# Patient Record
Sex: FEMALE | Race: WHITE | NOT HISPANIC OR LATINO | Employment: OTHER | URBAN - METROPOLITAN AREA
[De-identification: names, ages, dates, MRNs, and addresses within clinical notes are randomized per-mention and may not be internally consistent; named-entity substitution may affect disease eponyms.]

---

## 2023-01-01 ENCOUNTER — HOSPITAL ENCOUNTER (OUTPATIENT)
Dept: DATA CONVERSION | Facility: HOSPITAL | Age: 73
Discharge: HOME | End: 2023-08-08

## 2023-01-01 DIAGNOSIS — J40 BRONCHITIS, NOT SPECIFIED AS ACUTE OR CHRONIC: ICD-10-CM

## 2023-08-29 ENCOUNTER — HOSPITAL ENCOUNTER (OUTPATIENT)
Dept: DATA CONVERSION | Facility: HOSPITAL | Age: 73
End: 2023-08-29

## 2023-08-29 DIAGNOSIS — J96.11 CHRONIC RESPIRATORY FAILURE WITH HYPOXIA (MULTI): ICD-10-CM

## 2023-09-13 PROBLEM — E03.9 HYPOTHYROIDISM: Status: ACTIVE | Noted: 2023-09-13

## 2023-09-13 PROBLEM — F51.05 INSOMNIA DUE TO OTHER MENTAL DISORDER: Status: ACTIVE | Noted: 2023-09-13

## 2023-09-13 PROBLEM — M81.0 AGE-RELATED OSTEOPOROSIS WITHOUT CURRENT PATHOLOGICAL FRACTURE: Status: ACTIVE | Noted: 2023-09-13

## 2023-09-13 PROBLEM — F41.1 GENERALIZED ANXIETY DISORDER: Status: ACTIVE | Noted: 2023-09-13

## 2023-09-13 PROBLEM — Z85.528 HISTORY OF RENAL CELL CANCER: Status: ACTIVE | Noted: 2023-09-13

## 2023-09-13 PROBLEM — J43.9: Status: ACTIVE | Noted: 2023-09-13

## 2023-09-13 PROBLEM — R39.9 SYMPTOMS INVOLVING URINARY SYSTEM: Status: ACTIVE | Noted: 2023-09-13

## 2023-09-13 PROBLEM — C64.9 RENAL CELL CARCINOMA (MULTI): Status: ACTIVE | Noted: 2023-09-13

## 2023-09-13 PROBLEM — M79.7 FIBROMYALGIA: Status: ACTIVE | Noted: 2023-09-13

## 2023-09-13 PROBLEM — E55.9 VITAMIN D DEFICIENCY: Status: ACTIVE | Noted: 2023-09-13

## 2023-09-13 PROBLEM — D72.829 LEUKOCYTOSIS: Status: ACTIVE | Noted: 2023-09-13

## 2023-09-13 PROBLEM — F99 INSOMNIA DUE TO OTHER MENTAL DISORDER: Status: ACTIVE | Noted: 2023-09-13

## 2023-09-13 PROBLEM — C80.1 MALIGNANCY (MULTI): Status: ACTIVE | Noted: 2023-09-13

## 2023-09-13 PROBLEM — M79.10 MYALGIA: Status: ACTIVE | Noted: 2023-09-13

## 2023-09-13 PROBLEM — H61.23 IMPACTED CERUMEN OF BOTH EARS: Status: ACTIVE | Noted: 2023-09-13

## 2023-09-13 PROBLEM — F41.9 ANXIETY: Status: ACTIVE | Noted: 2023-09-13

## 2023-09-13 PROBLEM — N95.1 MENOPAUSAL STATE: Status: ACTIVE | Noted: 2023-09-13

## 2023-09-13 PROBLEM — I25.10 CORONARY ARTERY DISEASE: Status: ACTIVE | Noted: 2023-09-13

## 2023-09-13 PROBLEM — R06.00 DYSPNEA: Status: ACTIVE | Noted: 2023-09-13

## 2023-09-13 PROBLEM — J44.9 CHRONIC OBSTRUCTIVE PULMONARY DISEASE (MULTI): Status: ACTIVE | Noted: 2023-09-13

## 2023-09-13 PROBLEM — M81.0 OSTEOPOROSIS: Status: ACTIVE | Noted: 2023-09-13

## 2023-09-13 PROBLEM — M35.9 SYSTEMIC INVOLVEMENT OF CONNECTIVE TISSUE, UNSPECIFIED (MULTI): Status: ACTIVE | Noted: 2023-09-13

## 2023-09-13 PROBLEM — I49.1 ATRIAL ECTOPY: Status: ACTIVE | Noted: 2023-09-13

## 2023-09-13 PROBLEM — L27.0 ALLERGIC DRUG RASH: Status: ACTIVE | Noted: 2023-09-13

## 2023-09-13 PROBLEM — S99.929A FOOT INJURY: Status: ACTIVE | Noted: 2023-09-13

## 2023-09-13 PROBLEM — E78.00 PURE HYPERCHOLESTEROLEMIA: Status: ACTIVE | Noted: 2023-09-13

## 2023-09-13 PROBLEM — S93.601A SPRAIN OF RIGHT FOOT: Status: ACTIVE | Noted: 2023-09-13

## 2023-09-13 PROBLEM — M19.90 INFLAMMATORY ARTHRITIS: Status: ACTIVE | Noted: 2023-09-13

## 2023-09-13 PROBLEM — M06.4 INFLAMMATORY POLYARTHROPATHY (MULTI): Status: ACTIVE | Noted: 2023-09-13

## 2023-09-13 PROBLEM — M36.1: Status: ACTIVE | Noted: 2023-09-13

## 2023-09-13 PROBLEM — M19.90 OSTEOARTHRITIS: Status: ACTIVE | Noted: 2023-09-13

## 2023-09-13 PROBLEM — D80.1 HYPOGAMMAGLOBULINEMIA (MULTI): Status: ACTIVE | Noted: 2023-09-13

## 2023-09-13 PROBLEM — F17.200 TOBACCO DEPENDENCY: Status: ACTIVE | Noted: 2023-09-13

## 2023-09-13 PROBLEM — M85.80 OSTEOPENIA: Status: ACTIVE | Noted: 2023-09-13

## 2023-09-13 PROBLEM — M25.50 ARTHRALGIA: Status: ACTIVE | Noted: 2023-09-13

## 2023-09-13 PROBLEM — R63.4 ABNORMAL WEIGHT LOSS: Status: ACTIVE | Noted: 2023-09-13

## 2023-09-13 RX ORDER — ATORVASTATIN CALCIUM 80 MG/1
1 TABLET, FILM COATED ORAL DAILY
COMMUNITY
Start: 2017-08-15

## 2023-09-13 RX ORDER — NAPROXEN 500 MG/1
1 TABLET ORAL
COMMUNITY
Start: 2017-03-13

## 2023-09-13 RX ORDER — PAROXETINE 10 MG/1
1 TABLET, FILM COATED ORAL DAILY
COMMUNITY

## 2023-09-13 RX ORDER — TALC
1 POWDER (GRAM) TOPICAL NIGHTLY PRN
COMMUNITY

## 2023-09-13 RX ORDER — AMOXICILLIN 500 MG/1
TABLET, FILM COATED ORAL
COMMUNITY
Start: 2022-01-01

## 2023-09-13 RX ORDER — ALBUTEROL SULFATE 0.83 MG/ML
3 SOLUTION RESPIRATORY (INHALATION) EVERY 6 HOURS
COMMUNITY
Start: 2014-01-10

## 2023-09-13 RX ORDER — LEVOTHYROXINE SODIUM 50 UG/1
TABLET ORAL
COMMUNITY

## 2023-09-13 RX ORDER — MULTIVITAMIN WITH IRON
1 TABLET ORAL DAILY
COMMUNITY

## 2023-09-13 RX ORDER — BUDESONIDE 180 UG/1
AEROSOL, POWDER RESPIRATORY (INHALATION)
COMMUNITY
Start: 2016-11-11

## 2023-09-13 RX ORDER — NICOTINE 7MG/24HR
1 PATCH, TRANSDERMAL 24 HOURS TRANSDERMAL DAILY
COMMUNITY
Start: 2023-01-01

## 2023-09-13 RX ORDER — DULOXETIN HYDROCHLORIDE 20 MG/1
1 CAPSULE, DELAYED RELEASE ORAL DAILY
COMMUNITY
Start: 2022-02-08

## 2023-09-13 RX ORDER — HYDROXYZINE HYDROCHLORIDE 50 MG/1
1 TABLET, FILM COATED ORAL NIGHTLY PRN
COMMUNITY

## 2023-09-13 RX ORDER — AMOXICILLIN AND CLAVULANATE POTASSIUM 875; 125 MG/1; MG/1
TABLET, FILM COATED ORAL
COMMUNITY
Start: 2023-01-01

## 2023-09-13 RX ORDER — PREDNISONE 10 MG/1
1 TABLET ORAL
COMMUNITY

## 2023-09-13 RX ORDER — HYDROCODONE BITARTRATE AND ACETAMINOPHEN 5; 325 MG/1; MG/1
TABLET ORAL
COMMUNITY
Start: 2022-01-01

## 2023-09-13 RX ORDER — NAPROXEN SODIUM 220 MG/1
1 TABLET, FILM COATED ORAL DAILY
COMMUNITY

## 2023-09-13 RX ORDER — PREDNISONE 20 MG/1
TABLET ORAL
COMMUNITY
Start: 2023-01-01

## 2023-09-13 RX ORDER — TRAZODONE HYDROCHLORIDE 50 MG/1
TABLET ORAL NIGHTLY
COMMUNITY
Start: 2023-01-01

## 2023-09-13 RX ORDER — ALBUTEROL SULFATE 90 UG/1
2 AEROSOL, METERED RESPIRATORY (INHALATION) EVERY 4 HOURS PRN
COMMUNITY

## 2023-09-13 RX ORDER — PAROXETINE HYDROCHLORIDE 20 MG/1
TABLET, FILM COATED ORAL
COMMUNITY
Start: 2023-01-01

## 2023-09-13 RX ORDER — FLUTICASONE FUROATE, UMECLIDINIUM BROMIDE AND VILANTEROL TRIFENATATE 100; 62.5; 25 UG/1; UG/1; UG/1
1 POWDER RESPIRATORY (INHALATION) DAILY
COMMUNITY

## 2023-09-13 RX ORDER — LEVOTHYROXINE SODIUM 75 UG/1
1 TABLET ORAL
COMMUNITY
Start: 2017-10-27

## 2023-09-13 RX ORDER — ASCORBIC ACID 125 MG
TABLET,CHEWABLE ORAL
COMMUNITY

## 2023-09-13 RX ORDER — BUSPIRONE HYDROCHLORIDE 7.5 MG/1
TABLET ORAL
COMMUNITY
Start: 2023-01-01

## 2023-09-13 RX ORDER — DOXYCYCLINE 100 MG/1
CAPSULE ORAL
COMMUNITY
Start: 2023-01-01

## 2023-09-13 RX ORDER — ATORVASTATIN CALCIUM 40 MG/1
TABLET, FILM COATED ORAL
COMMUNITY
Start: 2017-08-18

## 2023-11-12 ASSESSMENT — ENCOUNTER SYMPTOMS
ABDOMINAL PAIN: 0
SHORTNESS OF BREATH: 0
FEVER: 0

## 2023-11-12 NOTE — PROGRESS NOTES
[facility]: [facility]  PROGRESS NOTE      Mayra Cordova is a 72 y.o. female that is presenting today for No chief complaint on file..    Assessment/Plan   Diagnoses and all orders for this visit:  Chronic obstructive pulmonary disease, unspecified COPD type (CMS/HCC)  Anxiety  Osteoporosis without current pathological fracture, unspecified osteoporosis type  Hypothyroidism, unspecified type  Vitamin D deficiency  Chronic bullous emphysema (CMS/HCC)  History of renal cell cancer  Fibromyalgia    Subjective   72 y.o. female here for follow up.        Review of Systems   Constitutional:  Negative for fever.   Respiratory:  Negative for shortness of breath.    Cardiovascular:  Negative for chest pain.   Gastrointestinal:  Negative for abdominal pain.   All other systems reviewed and are negative.     Objective   There were no vitals filed for this visit.   There is no height or weight on file to calculate BMI.  Physical Exam  Vitals reviewed.   Constitutional:       Appearance: Normal appearance.   Cardiovascular:      Rate and Rhythm: Normal rate and regular rhythm.      Heart sounds: No murmur heard.  Pulmonary:      Breath sounds: Normal breath sounds. No wheezing, rhonchi or rales.   Musculoskeletal:      Right lower leg: No edema.      Left lower leg: No edema.       Diagnostic Results   Lab Results   Component Value Date    GLUCOSE 147 (H) 09/02/2023    CALCIUM 8.9 09/02/2023     09/02/2023    K 4.1 09/02/2023    CO2 25 09/02/2023     (H) 09/02/2023    BUN 14 09/02/2023    CREATININE 0.8 09/02/2023     Lab Results   Component Value Date    ALT 33 04/04/2023    AST 23 04/04/2023    ALKPHOS 115 04/04/2023    BILITOT 0.2 04/04/2023     Lab Results   Component Value Date    WBC 12.4 (H) 09/02/2023    HGB 11.2 (L) 09/02/2023    HCT 35.0 (L) 09/02/2023    MCV 95.6 09/02/2023     09/02/2023     Lab Results   Component Value Date    CHOL 237 (H) 12/13/2022    CHOL 318 (H)  "05/04/2021    CHOL 182 10/21/2020     Lab Results   Component Value Date    HDL 84 12/13/2022    HDL 83 05/04/2021    HDL 75 10/21/2020     Lab Results   Component Value Date    LDLCALC 129 12/13/2022    LDLCALC 206 (H) 05/04/2021    LDLCALC 89 10/21/2020     Lab Results   Component Value Date    TRIG 122 12/13/2022    TRIG 147 05/04/2021    TRIG 89 10/21/2020     No components found for: \"CHOLHDL\"  Lab Results   Component Value Date    HGBA1C 6.5 (H) 04/07/2023     Other labs not included in the list above were reviewed either before or during this encounter.    History    Past Medical History:   Diagnosis Date    Encounter for immunization 10/29/2012    Need for prophylactic vaccination and inoculation against influenza     No past surgical history on file.  Family History   Problem Relation Name Age of Onset    Lung cancer Mother      Rheum arthritis Mother      Stroke Father      Emphysema Father      Other (CVA) Father      Hypertension Brother 74     Diabetes Brother 74     Autoimmune disease Child offspring - F 51     Lupus Child offspring - F 51         possible SLE    Hyperlipidemia Child offspring - F 45     ADD / ADHD Child offspring - F 45      Social History     Socioeconomic History    Marital status:      Spouse name: Not on file    Number of children: Not on file    Years of education: Not on file    Highest education level: Not on file   Occupational History    Not on file   Tobacco Use    Smoking status: Not on file    Smokeless tobacco: Not on file   Substance and Sexual Activity    Alcohol use: Not on file    Drug use: Not on file    Sexual activity: Not on file   Other Topics Concern    Not on file   Social History Narrative    Not on file     Social Determinants of Health     Financial Resource Strain: Not on file   Food Insecurity: Not on file   Transportation Needs: Not on file   Physical Activity: Not on file   Stress: Not on file   Social Connections: Not on file   Intimate Partner " Violence: Not on file   Housing Stability: Not on file     Allergies   Allergen Reactions    Erythromycin Other and Unknown     Abdominal pain     Current Outpatient Medications on File Prior to Visit   Medication Sig Dispense Refill    albuterol 2.5 mg /3 mL (0.083 %) nebulizer solution Take 3 mL by nebulization every 6 hours.      amoxicillin (Amoxil) 500 mg tablet       amoxicillin-pot clavulanate (Augmentin) 875-125 mg tablet       aspirin 81 mg chewable tablet Chew 1 tablet (81 mg) once daily.      atorvastatin (Lipitor) 40 mg tablet Take by mouth.      atorvastatin (Lipitor) 80 mg tablet Take 1 tablet (80 mg) by mouth once daily.      budesonide (Pulmicort Flexhaler) 180 mcg/actuation inhaler Inhale.      busPIRone (Buspar) 7.5 mg tablet       cholecalciferol, vitamin D3, 25 mcg (1,000 unit) tablet,chewable Chew.      cholecalciferol-soy isoflavone 2,000-64 unit-mg tablet Take 1 tablet by mouth once daily.      doxycycline (Monodox) 100 mg capsule       DULoxetine (Cymbalta) 20 mg DR capsule Take 1 capsule (20 mg) by mouth once daily.      fluticasone-umeclidin-vilanter (Trelegy Ellipta) 100-62.5-25 mcg blister with device Inhale 1 puff once daily.      HYDROcodone-acetaminophen (Norco) 5-325 mg tablet       hydrOXYzine HCL (Atarax) 50 mg tablet Take 1 tablet (50 mg) by mouth as needed at bedtime. For 90 days      hydrOXYzine pamoate (Vistaril) 50 mg capsule TAKE 1 CAPSULE BY MOUTH EVERY DAY AT BEDTIME 30 capsule 1    levothyroxine (Synthroid, Levoxyl) 50 mcg tablet Take by mouth.      levothyroxine (Synthroid, Levoxyl) 75 mcg tablet Take 1 tablet (75 mcg) by mouth once daily in the morning. Take before meals. on an empty stomach in the morning      melatonin 3 mg tablet Take 1 tablet (3 mg) by mouth as needed at bedtime. For 90 days      melatonin 3 mg tablet TAKE 1 TABLET BY MOUTH ONE TIME DAILY 30 tablet 1    multivitamin (Multiple Vitamins) tablet Take 1 tablet by mouth once daily.      naproxen (Naprosyn)  500 mg tablet Take 1 tablet (500 mg) by mouth 2 times a day with meals.      nicotine (Nicoderm CQ) 7 mg/24 hr patch Place 1 patch on the skin once daily. For 30 days      oxygen (O2) gas therapy 2 L nasal cannula at night      PARoxetine (Paxil) 10 mg tablet Take 1 tablet (10 mg) by mouth once daily. For 90 days      PARoxetine (Paxil) 10 mg tablet TAKE 1 TABLET BY MOUTH ONE TIME DAILY 30 tablet 1    PARoxetine (Paxil) 20 mg tablet       predniSONE (Deltasone) 10 mg tablet Take 1 tablet (10 mg) by mouth. Tapered dose      predniSONE (Deltasone) 20 mg tablet       traZODone (Desyrel) 50 mg tablet Take by mouth once daily at bedtime. As directed for 30 days      Ventolin HFA 90 mcg/actuation inhaler Inhale 2 puffs every 4 hours if needed.       No current facility-administered medications on file prior to visit.     Immunization History   Administered Date(s) Administered    Flu vaccine, quadrivalent, high-dose, preservative free, age 65y+ (FLUZONE) 10/05/2021    Influenza, Unspecified 10/05/2009    Influenza, seasonal, injectable 10/29/2012    Pfizer Purple Cap SARS-CoV-2 03/20/2021, 04/17/2021, 11/19/2021, 12/15/2021    Pneumococcal conjugate vaccine, 13-valent (PREVNAR 13) 11/05/2015    Pneumococcal polysaccharide vaccine, 23-valent, age 2 years and older (PNEUMOVAX 23) 01/10/2014, 06/18/2014, 12/18/2019    Tdap vaccine, age 7 year and older (BOOSTRIX) 04/11/2016, 06/25/2018     Patient's medical history was reviewed and updated either before or during this encounter.       Jay Guillaume MD

## 2023-11-27 ENCOUNTER — APPOINTMENT (OUTPATIENT)
Dept: PRIMARY CARE | Facility: CLINIC | Age: 73
End: 2023-11-27